# Patient Record
Sex: FEMALE | Race: WHITE | Employment: FULL TIME | URBAN - METROPOLITAN AREA
[De-identification: names, ages, dates, MRNs, and addresses within clinical notes are randomized per-mention and may not be internally consistent; named-entity substitution may affect disease eponyms.]

---

## 2022-03-28 PROBLEM — N94.9 ADNEXAL CYST: Status: ACTIVE | Noted: 2022-03-28

## 2022-03-28 PROBLEM — N94.6 DYSMENORRHEA: Status: ACTIVE | Noted: 2022-03-28

## 2022-06-22 ENCOUNTER — OFFICE VISIT (OUTPATIENT)
Dept: OBGYN CLINIC | Age: 32
End: 2022-06-22
Payer: COMMERCIAL

## 2022-06-22 VITALS
WEIGHT: 120.4 LBS | BODY MASS INDEX: 20.55 KG/M2 | HEIGHT: 64 IN | SYSTOLIC BLOOD PRESSURE: 110 MMHG | DIASTOLIC BLOOD PRESSURE: 60 MMHG

## 2022-06-22 DIAGNOSIS — N94.9 ADNEXAL CYST: Primary | ICD-10-CM

## 2022-06-22 PROCEDURE — G8428 CUR MEDS NOT DOCUMENT: HCPCS | Performed by: OBSTETRICS & GYNECOLOGY

## 2022-06-22 PROCEDURE — G8420 CALC BMI NORM PARAMETERS: HCPCS | Performed by: OBSTETRICS & GYNECOLOGY

## 2022-06-22 PROCEDURE — 4004F PT TOBACCO SCREEN RCVD TLK: CPT | Performed by: OBSTETRICS & GYNECOLOGY

## 2022-06-22 PROCEDURE — 99213 OFFICE O/P EST LOW 20 MIN: CPT | Performed by: OBSTETRICS & GYNECOLOGY

## 2022-06-22 PROCEDURE — 76830 TRANSVAGINAL US NON-OB: CPT | Performed by: OBSTETRICS & GYNECOLOGY

## 2022-06-22 NOTE — PROGRESS NOTES
The patient is a 27 y.o. No obstetric history on file. who is seen for US secondary to follow up RT adnexal cysts  Has colonoscopy scheduled next month  Has had PT     US findings from today:  CX appears WNL  Uterus is retroverted and homogenous  Endo= 1.2 cm no intracavitary masses visualized  Rov visualized with follicles and simple cyst measuring 1.8 x 1.6 x 1.8 cm. There are 2 simple cysts adjacent to RT ovary measuring d1: 2.9 x 1.9 x 1.9 cm ( previous 2.6 x 2.4 x 2.0 cm) C2: 1.5 x 1.2 x 1.2 cm ( Previous 1.5 x 1.3 x 1.4 cm) these areas appear to be unchanged from previous    LOV visualized with follicles and no adnexal masses seen  Small amount of free fluid in PCDS and in RT adnexa   Volume: 78.62      US findings from 3/28/2022  CX appears WNL  Uterus is retroverted and homogenous  Endo = 7.2mm no intracavitary masses visualized  ROV is visualized with follicles and appears WNL, adjacent to ROV extending into right adnexa, 2 simple appearing cystic structures seen. Cysts appear to separate from ROV  C1: 2.6 x 2.4 x 2.0 CM  C2: 1.5 x 1.3 x 1.4 cm  Differentials include but not limited to: paratubal cyst vs peritoneal inclusion cyst vs cyst of unknown etiology. Free fluid seen around cysts. Transabdominal approach attempted however was unable to visualized due to abundant bowel gas  LOV is visualized with follicles and appear WNL  Left adnexal appear WNL  PCDS contains trace amount of simple appearing free fluid  Volume: 104.13     HISTORY:    No obstetric history on file. Patient's last menstrual period was 05/31/2022. Sexual History:  has sex with males  Contraception:  none  Current Outpatient Medications on File Prior to Visit   Medication Sig Dispense Refill    saccharomyces boulardii (FLORASTOR) 250 MG capsule Take 250 mg by mouth 2 times daily       No current facility-administered medications on file prior to visit. ROS:  Feeling well. No dyspnea or chest pain on exertion.   No abdominal pain, change in bowel habits, black or bloody stools. No urinary tract symptoms. GYN ROS: no breast pain or new or enlarging lumps on self exam.    PHYSICAL EXAM:  Blood pressure 110/60, height 5' 4\" (1.626 m), weight 120 lb 6.4 oz (54.6 kg), last menstrual period 05/31/2022. The patient appears well, alert, oriented x 3, in no distress. Reviewed US with pt   Peritubal cysts no obvious e/s  Offered dx l/s with cystectomy and possible excision of endometriosis vs ablation of endometriosis - will let me know after completes I workup    ASSESSMENT:    1.  Adnexal cyst  -     AMB POC US, TRANSVAGINAL       PLAN:  Repeat us in 6 mos vs diagnostic laparoscopy with possible excision ablation of endometriosis and excision of paratubal cysts  Significant diarrhea with menses could be related to e/s  Will let me know           Caro Carr RN

## 2022-08-31 ENCOUNTER — HOSPITAL ENCOUNTER (OUTPATIENT)
Dept: LAB | Age: 32
Discharge: HOME | End: 2022-09-03

## 2022-08-31 PROCEDURE — 88312 SPECIAL STAINS GROUP 1: CPT

## 2022-08-31 PROCEDURE — 88305 TISSUE EXAM BY PATHOLOGIST: CPT

## 2023-02-21 NOTE — PROGRESS NOTES
The patient is a 27 y.o. G0 who is seen for follow up U/S. Pt reports that bleeding has decreased and is only heavy for one day of her cycle. Pt states cycles are shorter in length. Saw gastro- started on fiber and probiotic and stress reduction exercise- diarrhea before period but improved  Has had this particular ROV cyst since 2017  Noted hiatal hernia- is now taking pepcid  Will use antiinflammatories as needed for dysmenorrhea withpepcid    Endo/colonoscopy 2022 (results: A:  \" DUODENAL BIOPSIES\":        FRAGMENTS OF HISTOLOGICALLY UNREMARKABLE SMALL INTESTINE   B:  \" GASTRIC BIOPSIES\":        FRAGMENTS OF GASTRIC MUCOSA HAVING CHANGES OF REPAIR   C:  \" RANDOM COLON BIOPSIES\":        FRAGMENTS OF HISTOLOGICALLY UNREMARKABLE LARGE INTESTINE)     US findings from today:  [address] performed secondary to Rt adnexal F/U  CX appears wnl  Uterus is retroverted and heterogenous  Endo=6.0 mm, no intracavitary masses visualized  ROV is visualized with follicles and appears wnl  LOV visualized with follicles and appears wnl  RT adnexa simple cyst noted (hard to determine if it is the same as the prev), prev largest cyst measuring 2.9 x 1.9 x 1.9 cm  Lt adnexa WNL  Uterine 61.68    US findings from 2022:  CX appears WNL  Uterus is retroverted and homogenous  Endo= 1.2 cm no intracavitary masses visualized  Rov visualized with follicles and simple cyst measuring 1.8 x 1.6 x 1.8 cm. There are 2 simple cysts adjacent to RT ovary measuring d1: 2.9 x 1.9 x 1.9 cm ( previous 2.6 x 2.4 x 2.0 cm) C2: 1.5 x 1.2 x 1.2 cm ( Previous 1.5 x 1.3 x 1.4 cm) these areas appear to be unchanged from previous    LOV visualized with follicles and no adnexal masses seen  Small amount of free fluid in PCDS and in RT adnexa   Volume: 78.62    HISTORY:    Patient's last menstrual period was 2023 (exact date).   Sexual History:  has sex with males  Contraception:  none  Current Outpatient Medications on File Prior to Visit Medication Sig Dispense Refill    Lactobacillus-Inulin (CULTURELLE ADULT ULT BALANCE PO) Take by mouth      Wheat Dextrin (BENEFIBER) POWD Take 4 g by mouth 3 times daily (with meals)       No current facility-administered medications on file prior to visit. ROS:  Feeling well. No dyspnea or chest pain on exertion. No abdominal pain, change in bowel habits, black or bloody stools. No urinary tract symptoms. GYN ROS: normal menses, no abnormal bleeding, pelvic pain or discharge, no breast pain or new or enlarging lumps on self exam.    PHYSICAL EXAM:  Blood pressure 110/70, height 5' 4\" (1.626 m), weight 126 lb 3.2 oz (57.2 kg), last menstrual period 02/17/2023. The patient appears well, alert, oriented x 3, in no distress. US reviewed with persistent 3 cm cyst- simple present since 2018 at least  Offered to remove vs follow- will simply follow for now  Re dysmenorrhea- and gi symptoms- will try one day of anaprox with pepcid    ASSESSMENT:    1.  Adnexal cyst  -     AMB POC US, TRANSVAGINAL     PLAN:  Follow up in 6 months for annual  Anaprox 500 bid on day one with pepcid  Likely serous cystadenoma recheck in 1 year or sooner          Jack Kasper RN

## 2023-02-23 ENCOUNTER — OFFICE VISIT (OUTPATIENT)
Dept: OBGYN CLINIC | Age: 33
End: 2023-02-23
Payer: COMMERCIAL

## 2023-02-23 VITALS
SYSTOLIC BLOOD PRESSURE: 110 MMHG | DIASTOLIC BLOOD PRESSURE: 70 MMHG | BODY MASS INDEX: 21.54 KG/M2 | HEIGHT: 64 IN | WEIGHT: 126.2 LBS

## 2023-02-23 DIAGNOSIS — N94.6 DYSMENORRHEA: ICD-10-CM

## 2023-02-23 DIAGNOSIS — N94.9 ADNEXAL CYST: Primary | ICD-10-CM

## 2023-02-23 PROCEDURE — 99214 OFFICE O/P EST MOD 30 MIN: CPT | Performed by: OBSTETRICS & GYNECOLOGY

## 2023-02-23 PROCEDURE — 76830 TRANSVAGINAL US NON-OB: CPT | Performed by: OBSTETRICS & GYNECOLOGY

## 2023-02-23 RX ORDER — WHEAT DEXTRIN 3 G/3.8 G
4 POWDER (GRAM) ORAL
COMMUNITY

## 2023-02-23 RX ORDER — NAPROXEN 500 MG/1
500 TABLET ORAL 2 TIMES DAILY PRN
Qty: 30 TABLET | Refills: 1 | Status: SHIPPED | OUTPATIENT
Start: 2023-02-23

## 2024-03-05 SDOH — ECONOMIC STABILITY: FOOD INSECURITY: WITHIN THE PAST 12 MONTHS, YOU WORRIED THAT YOUR FOOD WOULD RUN OUT BEFORE YOU GOT MONEY TO BUY MORE.: NEVER TRUE

## 2024-03-05 SDOH — ECONOMIC STABILITY: INCOME INSECURITY: HOW HARD IS IT FOR YOU TO PAY FOR THE VERY BASICS LIKE FOOD, HOUSING, MEDICAL CARE, AND HEATING?: NOT VERY HARD

## 2024-03-05 SDOH — ECONOMIC STABILITY: HOUSING INSECURITY
IN THE LAST 12 MONTHS, WAS THERE A TIME WHEN YOU DID NOT HAVE A STEADY PLACE TO SLEEP OR SLEPT IN A SHELTER (INCLUDING NOW)?: NO

## 2024-03-05 SDOH — ECONOMIC STABILITY: TRANSPORTATION INSECURITY
IN THE PAST 12 MONTHS, HAS LACK OF TRANSPORTATION KEPT YOU FROM MEETINGS, WORK, OR FROM GETTING THINGS NEEDED FOR DAILY LIVING?: NO

## 2024-03-05 SDOH — ECONOMIC STABILITY: FOOD INSECURITY: WITHIN THE PAST 12 MONTHS, THE FOOD YOU BOUGHT JUST DIDN'T LAST AND YOU DIDN'T HAVE MONEY TO GET MORE.: NEVER TRUE

## 2024-03-07 ENCOUNTER — OFFICE VISIT (OUTPATIENT)
Dept: OBGYN CLINIC | Age: 34
End: 2024-03-07
Payer: COMMERCIAL

## 2024-03-07 VITALS
DIASTOLIC BLOOD PRESSURE: 66 MMHG | HEIGHT: 64 IN | WEIGHT: 127 LBS | SYSTOLIC BLOOD PRESSURE: 94 MMHG | BODY MASS INDEX: 21.68 KG/M2

## 2024-03-07 DIAGNOSIS — Z13.89 SCREENING FOR GENITOURINARY CONDITION: ICD-10-CM

## 2024-03-07 DIAGNOSIS — Z01.419 WELL WOMAN EXAM: Primary | ICD-10-CM

## 2024-03-07 DIAGNOSIS — Z11.51 SCREENING FOR HUMAN PAPILLOMAVIRUS (HPV): ICD-10-CM

## 2024-03-07 DIAGNOSIS — Z12.4 SCREENING FOR CERVICAL CANCER: ICD-10-CM

## 2024-03-07 LAB
BILIRUBIN, URINE, POC: NEGATIVE
BLOOD URINE, POC: NEGATIVE
GLUCOSE URINE, POC: NEGATIVE
KETONES, URINE, POC: NORMAL
LEUKOCYTE ESTERASE, URINE, POC: NEGATIVE
NITRITE, URINE, POC: NEGATIVE
PH, URINE, POC: 7.5 (ref 4.6–8)
PROTEIN,URINE, POC: NORMAL
SPECIFIC GRAVITY, URINE, POC: 1.02 (ref 1–1.03)
URINALYSIS CLARITY, POC: CLEAR
URINALYSIS COLOR, POC: NORMAL
UROBILINOGEN, POC: NORMAL

## 2024-03-07 PROCEDURE — 99395 PREV VISIT EST AGE 18-39: CPT | Performed by: OBSTETRICS & GYNECOLOGY

## 2024-03-07 PROCEDURE — 81002 URINALYSIS NONAUTO W/O SCOPE: CPT | Performed by: OBSTETRICS & GYNECOLOGY

## 2024-03-07 NOTE — PROGRESS NOTES
HPI:  Ms. Sanz is a 33 y.o.   OB History          0    Para        Term   0       0    AB   0    Living   0         SAB        IAB        Ectopic        Molar        Multiple        Live Births                 who is here today for a well woman exam. She complains of ***     Date Performed Result   PAP 21 [facility] Neg    Mammogram NA    Colonoscopy 22   A:  \" DUODENAL BIOPSIES\":        FRAGMENTS OF HISTOLOGICALLY UNREMARKABLE SMALL INTESTINE    Dexa NA        GYN History           No LMP recorded. Cycle Length {Numbers; 0-100:70869} Lasting {Numbers; 0-10:63673}  {Pos/neg trace:41681} dysmenorrhea; {Pos/neg trace:58024} postcoital bleeding    Past Medical History:  Past Medical History:   Diagnosis Date    Hiatal hernia     IBS (irritable bowel syndrome)     Ovarian cyst        Past Surgical History:  Past Surgical History:   Procedure Laterality Date    UPPER GASTROINTESTINAL ENDOSCOPY      WISDOM TOOTH EXTRACTION         Allergies:   No Known Allergies    Medication History:  Current Outpatient Medications   Medication Sig Dispense Refill    Lactobacillus-Inulin (CULTURELLE ADULT ULT BALANCE PO) Take by mouth      Wheat Dextrin (BENEFIBER) POWD Take 4 g by mouth 3 times daily (with meals)      naproxen (NAPROSYN) 500 MG tablet Take 1 tablet by mouth 2 times daily as needed for Pain 30 tablet 1     No current facility-administered medications for this visit.       Social History:  Social History     Socioeconomic History    Marital status: Single     Spouse name: Not on file    Number of children: Not on file    Years of education: Not on file    Highest education level: Not on file   Occupational History    Not on file   Tobacco Use    Smoking status: Never    Smokeless tobacco: Never   Vaping Use    Vaping Use: Never used   Substance and Sexual Activity    Alcohol use: Yes     Comment: occ    Drug use: Never    Sexual activity: Yes     Partners: Male     Birth 
nipple changes or axillary nodes, post-mastectomy site well healed and free of suspicious changes  PELVIC EXAM: External genitalia is within normal limits, urethra, urethra m and bladder are midline well supported. Vagina is well rugated, Cervix comes into full view and is within normal limits. Uterus is 7, mid tender week size, no ovarian masses palpated    Assessment/Plan:      Diagnosis Orders   1. Well woman exam  PAP IG, HPV Rfx HPV 16/18,45    AMB POC URINALYSIS DIP STICK MANUAL W/O MICRO      2. Screening for genitourinary condition  AMB POC URINALYSIS DIP STICK MANUAL W/O MICRO      3. Screening for human papillomavirus (HPV)  PAP IG, HPV Rfx HPV 16/18,45      4. Screening for cervical cancer  PAP IG, HPV Rfx HPV 16/18,45        Encounter Diagnoses   Name Primary?    Well woman exam Yes    Screening for genitourinary condition     Screening for human papillomavirus (HPV)     Screening for cervical cancer      Orders Placed This Encounter   Procedures    PAP IG, HPV Rfx HPV 16/18,45     Standing Status:   Future     Standing Expiration Date:   3/5/2025     Order Specific Question:   Pap Source?          (Required)     Answer:   CERVIX / ENDOCERVIX     Order Specific Question:   Pap collection method?          (Required)     Answer:   OTHER     Comments:   broom/brush     Order Specific Question:   Menstrual Status ?     Answer:   Having periods [5]     Order Specific Question:   Date of LMP?          (Required)     Answer:   3/7/2024    AMB POC URINALYSIS DIP STICK MANUAL W/O MICRO       pap smear  return annually or prn  Worsening dysmenorrhea  Consider us to recheck cyst from 2018 and look for e/s consider l/s  She will let me know    Daphnie Austin RN

## 2024-03-17 LAB
COLLECTION METHOD: NORMAL
CYTOLOGIST CVX/VAG CYTO: NORMAL
CYTOLOGY CVX/VAG DOC THIN PREP: NORMAL
HPV APTIMA: NEGATIVE
Lab: NORMAL
PAP SOURCE: NORMAL
PATH REPORT.FINAL DX SPEC: NORMAL
STAT OF ADQ CVX/VAG CYTO-IMP: NORMAL